# Patient Record
Sex: FEMALE | Race: WHITE | NOT HISPANIC OR LATINO | Employment: PART TIME | URBAN - METROPOLITAN AREA
[De-identification: names, ages, dates, MRNs, and addresses within clinical notes are randomized per-mention and may not be internally consistent; named-entity substitution may affect disease eponyms.]

---

## 2017-04-27 ENCOUNTER — HOSPITAL ENCOUNTER (EMERGENCY)
Facility: HOSPITAL | Age: 37
Discharge: HOME | End: 2017-04-27
Attending: EMERGENCY MEDICINE
Payer: MEDICAID

## 2017-04-27 VITALS
RESPIRATION RATE: 18 BRPM | SYSTOLIC BLOOD PRESSURE: 163 MMHG | TEMPERATURE: 99 F | OXYGEN SATURATION: 99 % | HEIGHT: 64 IN | WEIGHT: 272 LBS | HEART RATE: 84 BPM | DIASTOLIC BLOOD PRESSURE: 92 MMHG | BODY MASS INDEX: 46.44 KG/M2

## 2017-04-27 DIAGNOSIS — E11.65 TYPE 2 DIABETES MELLITUS WITH HYPERGLYCEMIA, WITHOUT LONG-TERM CURRENT USE OF INSULIN: ICD-10-CM

## 2017-04-27 DIAGNOSIS — I10 ESSENTIAL HYPERTENSION: ICD-10-CM

## 2017-04-27 DIAGNOSIS — S82.832A CLOSED FRACTURE OF DISTAL END OF LEFT FIBULA, UNSPECIFIED FRACTURE MORPHOLOGY, INITIAL ENCOUNTER: Primary | ICD-10-CM
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
## 2017-04-27 LAB
ALBUMIN SERPL BCP-MCNC: 3.3 G/DL
ALP SERPL-CCNC: 74 U/L
ALT SERPL W/O P-5'-P-CCNC: 28 U/L
ANION GAP SERPL CALC-SCNC: 13 MMOL/L
AST SERPL-CCNC: 25 U/L
BILIRUB SERPL-MCNC: 0.7 MG/DL
BUN SERPL-MCNC: 9 MG/DL
CALCIUM SERPL-MCNC: 9.5 MG/DL
CHLORIDE SERPL-SCNC: 101 MMOL/L
CO2 SERPL-SCNC: 21 MMOL/L
CREAT SERPL-MCNC: 0.8 MG/DL
EST. GFR  (AFRICAN AMERICAN): >60 ML/MIN/1.73 M^2
EST. GFR  (NON AFRICAN AMERICAN): >60 ML/MIN/1.73 M^2
GLUCOSE SERPL-MCNC: 278 MG/DL (ref 70–110)
GLUCOSE SERPL-MCNC: 322 MG/DL
POCT GLUCOSE: 246 MG/DL (ref 70–110)
POTASSIUM SERPL-SCNC: 3.7 MMOL/L
PROT SERPL-MCNC: 6.6 G/DL
SODIUM SERPL-SCNC: 135 MMOL/L

## 2017-04-27 PROCEDURE — 29515 APPLICATION SHORT LEG SPLINT: CPT | Mod: LT

## 2017-04-27 PROCEDURE — 96361 HYDRATE IV INFUSION ADD-ON: CPT

## 2017-04-27 PROCEDURE — 63600175 PHARM REV CODE 636 W HCPCS: Performed by: EMERGENCY MEDICINE

## 2017-04-27 PROCEDURE — 96374 THER/PROPH/DIAG INJ IV PUSH: CPT

## 2017-04-27 PROCEDURE — 82962 GLUCOSE BLOOD TEST: CPT

## 2017-04-27 PROCEDURE — 25000003 PHARM REV CODE 250: Performed by: EMERGENCY MEDICINE

## 2017-04-27 PROCEDURE — 99284 EMERGENCY DEPT VISIT MOD MDM: CPT | Mod: 25

## 2017-04-27 PROCEDURE — 80053 COMPREHEN METABOLIC PANEL: CPT

## 2017-04-27 RX ORDER — SODIUM CHLORIDE 9 MG/ML
1000 INJECTION, SOLUTION INTRAVENOUS
Status: COMPLETED | OUTPATIENT
Start: 2017-04-27 | End: 2017-04-27

## 2017-04-27 RX ORDER — METFORMIN HYDROCHLORIDE 1000 MG/1
1000 TABLET, FILM COATED, EXTENDED RELEASE ORAL
Qty: 30 TABLET | Refills: 0 | Status: SHIPPED | OUTPATIENT
Start: 2017-04-27 | End: 2017-05-27

## 2017-04-27 RX ORDER — KETOROLAC TROMETHAMINE 30 MG/ML
15 INJECTION, SOLUTION INTRAMUSCULAR; INTRAVENOUS
Status: COMPLETED | OUTPATIENT
Start: 2017-04-27 | End: 2017-04-27

## 2017-04-27 RX ORDER — LISINOPRIL AND HYDROCHLOROTHIAZIDE 10; 12.5 MG/1; MG/1
1 TABLET ORAL DAILY
Qty: 30 TABLET | Refills: 3 | Status: SHIPPED | OUTPATIENT
Start: 2017-04-27 | End: 2017-05-27

## 2017-04-27 RX ORDER — ORPHENADRINE CITRATE 30 MG/ML
60 INJECTION INTRAMUSCULAR; INTRAVENOUS
Status: DISCONTINUED | OUTPATIENT
Start: 2017-04-27 | End: 2017-04-27

## 2017-04-27 RX ORDER — ACETAMINOPHEN AND CODEINE PHOSPHATE 300; 30 MG/1; MG/1
1 TABLET ORAL EVERY 6 HOURS PRN
Qty: 8 TABLET | Refills: 0 | Status: SHIPPED | OUTPATIENT
Start: 2017-04-27

## 2017-04-27 RX ADMIN — SODIUM CHLORIDE 1000 ML: 0.9 INJECTION, SOLUTION INTRAVENOUS at 02:04

## 2017-04-27 RX ADMIN — KETOROLAC TROMETHAMINE 15 MG: 30 INJECTION, SOLUTION INTRAMUSCULAR at 02:04

## 2017-04-27 NOTE — ED AVS SNAPSHOT
[facility]  [address] [place] [address] [place] [address]               Diandra Ventura   2017  1:40 PM   ED    Description:  Female : 1980   Department:  [facility]           Your Care was Coordinated By:     Provider Role From To    Jany Yates MD Attending Provider 17 1341 --      Reason for Visit     Fall           Diagnoses this Visit        Comments    Closed fracture of distal end of left fibula, unspecified fracture morphology, initial encounter    -  Primary     Fall         Essential hypertension         Type 2 diabetes mellitus with hyperglycemia, without long-term current use of insulin           ED Disposition     None           To Do List           Follow-up Information     Please follow up.    Why:  FOLLOW UP WITH YOUR PCP ASAP       These Medications        Disp Refills Start End    acetaminophen-codeine 300-30mg (TYLENOL #3) 300-30 mg Tab 8 tablet 0 2017     Take 1 tablet by mouth every 6 (six) hours as needed. - Oral    SITagliptan (JANUVIA) 50 MG Tab 30 tablet 3 2017    Take 1 tablet (50 mg total) by mouth once daily. - Oral    metformin (GLUMETZA) 1000 MG (MOD) 24 hr tablet 30 tablet 0 2017    Take 1 tablet (1,000 mg total) by mouth daily with breakfast. - Oral    lisinopril-hydrochlorothiazide (PRINZIDE,ZESTORETIC) 10-12.5 mg per tablet 30 tablet 3 2017    Take 1 tablet by mouth once daily. - Oral      [facility]     [facility] Care [facility] -  Assistance  Unless otherwise directed by your provider, please contact [facility], our nurse care line that is available for  assistance.     Registered nurses in the [facility] provide: appointment scheduling, clinical advisement, health education, and other advisory services.  Call: [phone] (toll free)               Medications           Message regarding Medications     Verify the  changes and/or additions to your medication regime listed below are the same as discussed with your clinician today.  If any of these changes or additions are incorrect, please notify your healthcare provider.        START taking these NEW medications        Refills    acetaminophen-codeine 300-30mg (TYLENOL #3) 300-30 mg Tab 0    Sig: Take 1 tablet by mouth every 6 (six) hours as needed.    Class: Print    Route: Oral    SITagliptan (JANUVIA) 50 MG Tab 3    Sig: Take 1 tablet (50 mg total) by mouth once daily.    Class: Print    Route: Oral    metformin (GLUMETZA) 1000 MG (MOD) 24 hr tablet 0    Sig: Take 1 tablet (1,000 mg total) by mouth daily with breakfast.    Class: Print    Route: Oral    lisinopril-hydrochlorothiazide (PRINZIDE,ZESTORETIC) 10-12.5 mg per tablet 3    Sig: Take 1 tablet by mouth once daily.    Class: Print    Route: Oral      These medications were administered today        Dose Freq    0.9%  NaCl infusion 1,000 mL ED 1 Time    Sig: Inject 1,000 mLs into the vein ED 1 Time.    Class: Normal    Route: Intravenous    ketorolac injection 15 mg 15 mg ED 1 Time    Sig: Inject 15 mg into the vein ED 1 Time.    Class: Normal    Route: Intravenous      STOP taking these medications     hydrocodone-acetaminophen 5-325mg (NORCO) 5-325 mg per tablet Take 1 tablet by mouth every 4 (four) hours as needed.           Verify that the below list of medications is an accurate representation of the medications you are currently taking.  If none reported, the list may be blank. If incorrect, please contact your healthcare provider. Carry this list with you in case of emergency.           Current Medications     acetaminophen-codeine 300-30mg (TYLENOL #3) 300-30 mg Tab Take 1 tablet by mouth every 6 (six) hours as needed.    gabapentin (NEURONTIN) 600 MG tablet Take 600 mg by mouth 3 (three) times daily.    lisinopril-hydrochlorothiazide (PRINZIDE,ZESTORETIC) 10-12.5 mg per tablet Take 1 tablet by mouth once  "daily.    metformin (GLUMETZA) 1000 MG (MOD) 24 hr tablet Take 1 tablet (1,000 mg total) by mouth daily with breakfast.    SITagliptan (JANUVIA) 50 MG Tab Take 1 tablet (50 mg total) by mouth once daily.           Clinical Reference Information           Your Vitals Were     BP Pulse Temp Resp Height Weight    163/92 84 98.6 °F (37 °C) (Oral) 18 5' 4" (1.626 m) 123.4 kg (272 lb)    Last Period SpO2 BMI          04/09/2017 99% 46.69 kg/m2        Allergies as of 4/27/2017     No Known Allergies      Immunizations Administered on Date of Encounter - 4/27/2017     None      ED Micro, Lab, POCT     Start Ordered       Status Ordering Provider    04/27/17 1610 04/27/17 1610  POCT glucose  Once      Final result     04/27/17 1600 04/27/17 1559  POCT glucose  Once      Acknowledged     04/27/17 1419 04/27/17 1418  Comprehensive metabolic panel  STAT      Final result     04/27/17 0000 04/27/17 1340  POCT urine pregnancy     Comments:  This order was created through External Result Entry    Completed     04/27/17 0000 04/27/17 1340  POCT glucose     Comments:  This order was created through External Result Entry    Completed       ED Imaging Orders     Start Ordered       Status Ordering Provider    04/27/17 1413 04/27/17 1413  X-Ray Tibia Fibula 2 View Left  1 time imaging      Final result     04/27/17 1413 04/27/17 1413  X-Ray Ankle Complete Left  1 time imaging      Edited Result - FINAL     04/27/17 1413 04/27/17 1413  X-Ray Foot Complete Left  1 time imaging      Final result         Discharge Instructions         FOLLOW UP WITH PRIMARY CARE ASAP  RETURN TO ED IMMEDIATELY IF SYMPTOMS WORSEN  DO NOT TAKE PAIN MEDS AND DRIVE OR OPERATE MACHINERY    Ankle Fracture, Distal Fibula  You have a fracture, or broken bone, of the end of the fibula bone. The fibula is one of two bones that support the ankle joint.    Home care  · You will be given a splint, cast, or special boot to prevent movement at the injury site. Do not put " weight on a splint. It will break. Follow your healthcare providers advice about when to begin bearing weight on a cast or boot.  · Keep your leg elevated when sitting or lying down. When sleeping, place a pillow under the injured leg. When sitting, support the injured leg so it is level with your waist. This is very important during the first 48 hours.  · Keep the cast or splint completely dry at all times. When bathing, protect the cast or splint with 2 large plastic bags. Place 1 bag outside of the other. Tape each bag with duct tape at the top end. Water can still leak in even when the foot is covered. So it's best to keep the cast, splint, or boot away from water. If a fiberglass cast or splint gets wet, dry it with a hair dryer on a cool setting.  · Place an ice pack over the injured area for no more than 15 to 20 minutes. Do this every 3 to 6 hours for the first 24 to 48 hours. Continue this 3 to 4 times a day as needed. To make an ice pack, put ice cubes in a plastic bag that seals at the top. Wrap the bag in a clean, thin towel or cloth. Never put ice or an ice pack directly on the skin. The ice pack can be put right on the cast or splint. As the ice melts, be careful that the cast or splint doesnt get wet.  · You may use over-the-counter pain medicine to control pain, unless another pain medicine was prescribed. If you have chronic liver or kidney disease or ever had a stomach ulcer or GI bleeding, talk with your provider before using these medicines.  Follow-up care  Follow up with your healthcare provider in 1 week, or as advised. This is to be sure the bone is healing properly. If you were given a splint, it may be changed to a cast after the swelling goes down.  If X-rays were taken, you will be told of any new findings that may affect your care.  When to seek medical advice  Call your healthcare provider right away if any of these occur:  · The plaster cast or splint becomes wet or soft  · The  fiberglass cast or splint stays wet for more than 24 hours  · There is increased tightness or pain under the cast or splint  · Your toes become swollen, cold, blue, numb, or tingly  · The cast becomes loose  · The cast has a bad smell  · Sore areas develop under the cast  · The cast develops cracks or breaks   Date Last Reviewed: 11/23/2015  © 1565-1400 [employer]. [address] [place] [address], [place] [address]. All rights reserved. This information is not intended as a substitute for professional medical care. Always follow your healthcare professional's instructions.          Discharge Instructions for High Blood Pressure (Hypertension)  You have been diagnosed with high blood pressure (also called hypertension). This means the force of blood against your artery walls is too strong. It also means your heart is working hard to move blood. High blood pressure usually has no symptoms, but over time, it can damage your heart, blood vessels, eyes, kidneys, and other organs. With help from your doctor, you can manage your blood pressure and protect your health.  Taking medicine  · Learn to take your own blood pressure. Keep a record of your results. Ask your doctor which readings mean that you need medical attention.  · Take your blood pressure medicine exactly as directed. Dont skip doses. Missing doses can cause your blood pressure to get out of control.  · If you do miss a dose (or doses) check with your healthcare provider about what to do.  · Avoid medicine that contain heart stimulants, including over-the-counter drugs. Check for warnings about high blood pressure on the label. Ask the pharmacist before purchasing something you haven't used before  · Check with your doctor or pharmacist before taking a decongestant. Some decongestants can worsen high blood pressure.  Lifestyle changes  · Maintain a healthy weight. Get help to lose any extra pounds.  · Cut back on salt.  ¨ Limit canned, dried, packaged,  "and fast foods.  ¨ Dont add salt to your food at the table.  ¨ Season foods with herbs instead of salt when you cook.  ¨ Request no added salt when you go to a restaurant.  ¨ The American Heart Associations (AHA) "ideal" sodium intake recommendation is 1,500 milligrams per day.  However, since American's eat so much salt, the AHA says a positive change can occur by cutting back to even 2,400 milligrams of sodium a day.   · Follow the DASH (Dietary Approaches to Stop Hypertension) eating plan. This plan recommends vegetables, fruits, whole gains, and other heart healthy foods.  · Begin an exercise program. Ask your doctor how to get started. The American Heart Association recommends aerobic exercise 3 to 4 times a week for an average of 40 minutes at a time, with your doctor's approval. Simple activities like walking or gardening can help.  · Break the smoking habit. Enroll in a stop-smoking program to improve your chances of success. Ask your healthcare provider about programs and medicines to help you stop smoking.  · Limit drinks that contain caffeine (coffee, black or green tea, cola) to 2 per day.  · Never take stimulants such as amphetamines or cocaine; these drugs can be deadly for someone with high blood pressure.  · Control your stress. Learn stress-management techniques.  · Limit alcohol to no more than 1 drink a day for women and 2 drinks a day for men.  Follow-up care  Make a follow-up appointment as directed by our staff.     When to seek medical care  Call your doctor immediately or seek emergency care if you have any of the following:  · Chest pain or shortness of breath (call 911)  · Moderate to severe headache  · Weakness in the muscles of your face, arms, or legs  · Trouble speaking  · Extreme drowsiness  · Confusion  · Fainting or dizziness  · Pulsating or rushing sound in your ears  · Unexplained nosebleed  · Weakness, tingling, or numbness of your face, arms, or legs  · Change in " vision  · Blood pressure measured at home that is greater than 180/110   Date Last Reviewed: 4/27/2016  © 7900-2110 [employer]. [address] [place] [address], [place] [address]. All rights reserved. This information is not intended as a substitute for professional medical care. Always follow your healthcare professional's instructions.        Managing Type 2 Diabetes    Type 2 diabetes is a long-term (chronic) condition. Managing your diabetes means making some changes that may be hard. Your healthcare provider, nurse, diabetes educator, and others can help you.  Managing type 2 diabetes means balancing your medicine with diet and activity. Managing your diabetes may also include checking your blood sugar. And, working with your healthcare provider to prevent complications.  Take your medicine as prescribed  You may take pills (oral medicine) or give yourself shots (usually insulin injections) for diabetes. Or you may use both. Taking your medicines or giving yourself insulin at the right times will help you control your blood sugar. Think about ways that will help you remember to take your medicines the right way every day. Ask your healthcare provider, nurse, or diabetes educator for ideas.  Although you may only take pills for your diabetes, this may change. Over time, most people with type 2 diabetes also use insulin.  Eat healthy  A healthy, well-planned diet helps control the amount of sugar in your blood. It also helps you stay at a healthy weight or lose weight, if you are overweight. Extra weight makes it harder to control diabetes.  Your healthcare provider, nurse, a dietitian, or diabetes educator will help you create a plan that works for you. You don't have to give up all the foods you like. Having meals and snacks with vegetables, fruits, lean meats, or other healthy proteins, whole grains, and low-fat or nonfat dairy products will help control your blood sugar.  Be physically active  Being  active helps lower your blood sugar. It does this by helping your body use insulin to turn food into energy. Activity also helps you manage your weight:  Ask your health care provider to work with you to create an activity program that's right for you. Your activity program is based on your age, general health, and types of activity you enjoy. You should start slowly, but aim for at least 30 minutes of exercise or activity on most days.  Check your blood sugar  Checking your own blood sugar may be a regular part of your care. Or you may only check your blood sugar from time to time. Your healthcare provider will give you instructions about checking your blood sugar at home. Checking it tells you if your blood sugar is in your target range. Having your blood sugars within the target range means that you are managing your diabetes well.  If your blood sugar levels are too high or too low, your healthcare provider may suggest changes to your diet or activity level. He or she may also adjust your medicine.  Your healthcare provider may also tell you to check your blood sugar more often when you are sick. At certain times, for example, when you have a cold or the flu, you may need to check it more often.  Take care of yourself  When you have diabetes, you may be more likely to develop other health problems. They include foot, eye, heart, and kidney problems. By controlling your blood sugar, and taking good care of yourself, you can help prevent these problems. Your health care provider, nurse, diabetes educator, and others can assist you:  · Checkups. You should have regular checkups with your healthcare provider. At those visits, you will have a physical exam that includes checking your feet. Your healthcare provider will also check your blood pressure and weight.  · Other exams. You should also have complete eye, foot, and dental exams at least once every year.  · Lab tests. You will have blood and urine tests:  ¨ At  least two times a year, your healthcare provider will check your hemoglobin A1C. This blood test shows how well you have been controlling your blood sugar over 2 to 3 months. The results help your healthcare provider manage your diabetes.    ¨ You will also have other lab tests. For example, to check for kidney problems and abnormal cholesterol levels.  · Smoking. If you smoke, you will need to quit. Smoking increases the chance that you will develop complications from diabetes. Ask your healthcare provider about ways to quit.  · Vaccines. Get a yearly flu shot. And, ask your healthcare provider about vaccines to prevent pneumonia, shingles, and hepatitis B.  Stress and depression  Most people have challenges throughout their lives. Living with diabetes, or any serious condition, can increase your stress and make you feel a lot of different emotions. In diabetes, feeling stressed or depressed can actually affect your blood sugar levels.  If you are having trouble dealing with diabetes, tell your healthcare provider. He or she can help or refer you to other healthcare providers or programs.  Support and resources  Know where you can get help. You can try the following:  · Support. Ask family and friends to support your effects to take care of yourself. Or, look for a diabetes support group locally or on the internet. (Check the Connect with Others link on www.diabetes.org)  · Counseling. Talk with a , psychologist, psychiatrist, or other counselor.  · Information. Contact the American Diabetes Association at 772-175-7125 or www.diabetes.org  Date Last Reviewed: 6/1/2016 © 2000-2016 [employer]. [address] [place] [address], [place] [address]. All rights reserved. This information is not intended as a substitute for professional medical care. Always follow your healthcare professional's instructions.          [facility] Sign-Up     Activating your [facility] account is as easy as 1-2-3!     1)  Visit [email or web address], select Sign Up Now, enter this activation code and your date of birth, then select Next.  [email or web address]  Expires: 6/11/2017  4:44 PM      2) Create a username and password to use when you visit [facility] in the future and select a security question in case you lose your password and select Next.    3) Enter your e-mail address and click Sign Up!    Additional Information  If you have questions, please e-mail [email or web address] or call [phone] to talk to our [facility] staff. Remember, [facility] is NOT to be used for urgent needs. For medical emergencies, dial 911.         Smoking Cessation     If you would like to quit smoking:   You may be eligible for free services if you are a [place] resident and started smoking cigarettes before September 1, 1988.  Call the Smoking Cessation Trust ([facility]) toll free at [phone] or [phone].   Call 7-801-QUIT-NOW if you do not meet the above criteria.   Contact us via email: [email or web address]   View our website for more information: [email or web address]         [facility] complies with applicable Federal civil rights laws and does not discriminate on the basis of race, color, national origin, age, disability, or sex.        Language Assistance Services     ATTENTION: Language assistance services are available, free of charge. Please call [phone].      ATENCIÓN: Si habla español, tiene a akhtar disposición servicios gratuitos de asistencia lingüística. Llame al [phone].     CHÚ Ý: N?u b?n nói Ti?ng Vi?t, có các d?ch v? h? tr? ngôn ng? mi?n phí dành cho b?n. G?i s? [phone].

## 2017-04-27 NOTE — ED PROVIDER NOTES
"Encounter Date: 2017       History     Chief Complaint   Patient presents with    Fall     slipped at home today, falling onto left side. C/o pain to entire left leg, worst to left foot since the fall     Review of patient's allergies indicates:  No Known Allergies  HPI Comments: 38 Y/O F PRESENTS S/P SLIP AND FALL FROM STANDING POSITION THIS MORNING.  PT REPORTS PAIN TO THE ENTIRE LEG THAT "GETS WORSE AND YOU GO DOWN MY LEG."  NO HEAD, NECK OR BACK INJURY.  PT REPORTS THAT AFTER SHE FELL SHE ATTEMPTED TO STAND UP AND HAD SEVERE PAIN IN HER LEFT FOOT WHILE WEIGHTBEARING.  NO NUMBNESS/WEAKNESS OF LLE.  NO OPEN WOUNDS.  + OLD BRUISING LLE.  PT REPORTS HX OF HTN AND STATES THAT SHE HAS BEEN OFF OF HER MEDS FOR THE PAST MONTH BECAUSE HER PCP WAS ON [facility]ERN[facility] LEAVE AND SHE DOESN'T LIKE ANYONE ELSE IN THE OFFICE.  PT CALLED PCP TODAY AND THEY DID NOT HAVE AN APPT.   PT REPORTS SHE HAS BEEN ON LISINOPRIL/HCTZ FOR SEVERAL YEARS BUT IS UNSURE IF IT WAS EFFECTIVE AT TREATING HER B/P.  NO HA, CP, SOB, OR NEURO SYMPTOMS.     The history is provided by the patient. No  was used.     Past Medical History:   Diagnosis Date    Diabetes mellitus     Hypertension      Past Surgical History:   Procedure Laterality Date     SECTION       History reviewed. No pertinent family history.  Social History   Substance Use Topics    Smoking status: Current Every Day Smoker    Smokeless tobacco: None    Alcohol use No     Review of Systems   Constitutional: Negative for chills and fever.   HENT: Negative for facial swelling.    Eyes: Negative.    Cardiovascular: Negative for chest pain and leg swelling.   Gastrointestinal: Negative for abdominal pain, diarrhea, nausea and vomiting.   Endocrine: Negative for polydipsia and polyuria.   Genitourinary: Negative for difficulty urinating and dysuria.   Musculoskeletal: Negative for back pain and neck pain.        LEFT LOWER EXTREMITY PAIN (ANKLE AND FOOT) "   Skin: Negative for pallor and rash.        OLD BRUISING LEFT CALF, NEW BRUISING LEFT FOOT AND ANKLE   Allergic/Immunologic: Negative for immunocompromised state.   Neurological: Negative for dizziness, weakness, numbness and headaches.   Hematological: Does not bruise/bleed easily.   Psychiatric/Behavioral: Negative for agitation and confusion. The patient is nervous/anxious.    All other systems reviewed and are negative.      Physical Exam   Initial Vitals   BP Pulse Resp Temp SpO2   04/27/17 1337 04/27/17 1337 04/27/17 1337 04/27/17 1337 04/27/17 1337   181/123 109 18 98.2 °F (36.8 °C) 99 %     Physical Exam    Nursing note and vitals reviewed.  Constitutional: She appears well-developed and well-nourished. She is not diaphoretic. No distress.   HTN, ANXIOUS, OBESE F    HENT:   Head: Normocephalic and atraumatic.   Eyes: Conjunctivae and EOM are normal. No scleral icterus.   Neck: Normal range of motion. Neck supple.   Cardiovascular: Normal rate, regular rhythm and normal heart sounds. Exam reveals no gallop and no friction rub.    No murmur heard.  Pulmonary/Chest: Breath sounds normal. No respiratory distress. She has no wheezes. She has no rhonchi. She has no rales.   Abdominal: Soft. Bowel sounds are normal. She exhibits no distension. There is no tenderness. There is no rebound and no guarding.   Musculoskeletal: Normal range of motion. She exhibits tenderness. She exhibits no edema.        Left knee: She exhibits normal range of motion, no swelling, no effusion, no deformity, no laceration, no erythema and no bony tenderness. No tenderness found. No medial joint line and no lateral joint line tenderness noted.        Left ankle: She exhibits normal range of motion, no swelling, no deformity and no laceration. Tenderness. Lateral malleolus and head of 5th metatarsal tenderness found. No medial malleolus and no proximal fibula tenderness found. Achilles tendon exhibits no pain.        Legs:        "Feet:    FULL ROM LEFT HIP, KNEE, ANKLE.  NO DEFORMITY   Lymphadenopathy:     She has no cervical adenopathy.   Neurological: She is alert and oriented to person, place, and time. She has normal strength. No sensory deficit.   Skin: Skin is warm and dry. No rash noted. No erythema.   OLD BRUISING LEFT CALF, NEW BRUISING LEFT FOOT AND ANKLE   Psychiatric: She has a normal mood and affect. Her behavior is normal. Judgment and thought content normal.         ED Course   Procedures  Labs Reviewed - No data to display          Medical Decision Making:   Initial Assessment:   36 Y/O F PRESENTS WITH LLE PAIN S/P FALL AND IS NOTED TO BE HTN.  NO SYMPTOMS OF CP, SOB, OR NEURO SYMPTOMS.   Differential Diagnosis:   DDX:  FRACTURE, DISLOCATION, CONTUSION, SPRAIN, HTN, HTN EMERGENCY, DKA, DM WITH HYPERGLYCEMIA  Clinical Tests:   Lab Tests: Ordered and Reviewed  Radiological Study: Ordered and Reviewed  Medical Tests: Ordered and Reviewed  ED Management:  XRAYS REVEAL POSSIBLE NON-DISPLACED DISTAL FIBULA FRACTURE.  PT DOES HAVE PAIN ON PALPATION OF THIS AREA SO I WILL TX WITH WALKING BOOT.  PT UNDERSTANDS SHE MUST F/U WITH PCP NEXT WEEK AND A REPEAT XRAY WOULD BE APPROPRIATE.  SHE HAS BEEN OUT OF HER HBP AND DM MEDS FOR "MONTHS."  SHE REPORTS THAT HER PCP IS AT A WALK IN CL[facility] AND SHE WILL F/U ASAP.  I WILL RX PT ONE MONTH OF MEDICATIONS FOR HER CHRONIC MEDICAL ISSUES WITH THE UNDERSTANDING THAT SHE WILL F/U WITH PCP ASAP AND RETURN TO ED IMMEDIATELY IF SYMPTOMS WORSEN.                    ED Course     Clinical Impression:   The primary encounter diagnosis was Closed fracture of distal end of left fibula, unspecified fracture morphology, initial encounter. Diagnoses of Fall, Essential hypertension, and Type 2 diabetes mellitus with hyperglycemia, without long-term current use of insulin were also pertinent to this visit.    Disposition:   Disposition: Discharged  Condition: Stable       Jany Yates MD  04/27/17 " [id number]

## 2017-04-27 NOTE — ED NOTES
Pt presents to ED with c/o fall. Pt states she slipped and fell this AM around 0800. Pt states she then went to bed to lay down and rest, thinking pain would get better. She got up a few hours later with worsening pain. Pt unable to bear weight on left leg. Pt c/o pain to entire left leg, worse to left foot. Denies pain to left side or arm. Pt winces with pain on palpation. 2+ pedal pulses noted. Bruising noted to top of left foot and back of left leg. No obvious deformity or swelling noted.

## 2017-04-27 NOTE — DISCHARGE INSTRUCTIONS
FOLLOW UP WITH PRIMARY CARE ASAP  RETURN TO ED IMMEDIATELY IF SYMPTOMS WORSEN  DO NOT TAKE PAIN MEDS AND DRIVE OR OPERATE MACHINERY    Ankle Fracture, Distal Fibula  You have a fracture, or broken bone, of the end of the fibula bone. The fibula is one of two bones that support the ankle joint.    Home care  · You will be given a splint, cast, or special boot to prevent movement at the injury site. Do not put weight on a splint. It will break. Follow your healthcare providers advice about when to begin bearing weight on a cast or boot.  · Keep your leg elevated when sitting or lying down. When sleeping, place a pillow under the injured leg. When sitting, support the injured leg so it is level with your waist. This is very important during the first 48 hours.  · Keep the cast or splint completely dry at all times. When bathing, protect the cast or splint with 2 large plastic bags. Place 1 bag outside of the other. Tape each bag with duct tape at the top end. Water can still leak in even when the foot is covered. So it's best to keep the cast, splint, or boot away from water. If a fiberglass cast or splint gets wet, dry it with a hair dryer on a cool setting.  · Place an ice pack over the injured area for no more than 15 to 20 minutes. Do this every 3 to 6 hours for the first 24 to 48 hours. Continue this 3 to 4 times a day as needed. To make an ice pack, put ice cubes in a plastic bag that seals at the top. Wrap the bag in a clean, thin towel or cloth. Never put ice or an ice pack directly on the skin. The ice pack can be put right on the cast or splint. As the ice melts, be careful that the cast or splint doesnt get wet.  · You may use over-the-counter pain medicine to control pain, unless another pain medicine was prescribed. If you have chronic liver or kidney disease or ever had a stomach ulcer or GI bleeding, talk with your provider before using these medicines.  Follow-up care  Follow up with your healthcare  provider in 1 week, or as advised. This is to be sure the bone is healing properly. If you were given a splint, it may be changed to a cast after the swelling goes down.  If X-rays were taken, you will be told of any new findings that may affect your care.  When to seek medical advice  Call your healthcare provider right away if any of these occur:  · The plaster cast or splint becomes wet or soft  · The fiberglass cast or splint stays wet for more than 24 hours  · There is increased tightness or pain under the cast or splint  · Your toes become swollen, cold, blue, numb, or tingly  · The cast becomes loose  · The cast has a bad smell  · Sore areas develop under the cast  · The cast develops cracks or breaks   Date Last Reviewed: 11/23/2015  © 6258-1772 [employer]. [address] [place] [address]. All rights reserved. This information is not intended as a substitute for professional medical care. Always follow your healthcare professional's instructions.          Discharge Instructions for High Blood Pressure (Hypertension)  You have been diagnosed with high blood pressure (also called hypertension). This means the force of blood against your artery walls is too strong. It also means your heart is working hard to move blood. High blood pressure usually has no symptoms, but over time, it can damage your heart, blood vessels, eyes, kidneys, and other organs. With help from your doctor, you can manage your blood pressure and protect your health.  Taking medicine  · Learn to take your own blood pressure. Keep a record of your results. Ask your doctor which readings mean that you need medical attention.  · Take your blood pressure medicine exactly as directed. Dont skip doses. Missing doses can cause your blood pressure to get out of control.  · If you do miss a dose (or doses) check with your healthcare provider about what to do.  · Avoid medicine that contain heart stimulants, including  "over-the-counter drugs. Check for warnings about high blood pressure on the label. Ask the pharmacist before purchasing something you haven't used before  · Check with your doctor or pharmacist before taking a decongestant. Some decongestants can worsen high blood pressure.  Lifestyle changes  · Maintain a healthy weight. Get help to lose any extra pounds.  · Cut back on salt.  ¨ Limit canned, dried, packaged, and fast foods.  ¨ Dont add salt to your food at the table.  ¨ Season foods with herbs instead of salt when you cook.  ¨ Request no added salt when you go to a restaurant.  ¨ The American Heart Associations (AHA) "ideal" sodium intake recommendation is 1,500 milligrams per day.  However, since American's eat so much salt, the AHA says a positive change can occur by cutting back to even 2,400 milligrams of sodium a day.   · Follow the DASH (Dietary Approaches to Stop Hypertension) eating plan. This plan recommends vegetables, fruits, whole gains, and other heart healthy foods.  · Begin an exercise program. Ask your doctor how to get started. The American Heart Association recommends aerobic exercise 3 to 4 times a week for an average of 40 minutes at a time, with your doctor's approval. Simple activities like walking or gardening can help.  · Break the smoking habit. Enroll in a stop-smoking program to improve your chances of success. Ask your healthcare provider about programs and medicines to help you stop smoking.  · Limit drinks that contain caffeine (coffee, black or green tea, cola) to 2 per day.  · Never take stimulants such as amphetamines or cocaine; these drugs can be deadly for someone with high blood pressure.  · Control your stress. Learn stress-management techniques.  · Limit alcohol to no more than 1 drink a day for women and 2 drinks a day for men.  Follow-up care  Make a follow-up appointment as directed by our staff.     When to seek medical care  Call your doctor immediately or seek " emergency care if you have any of the following:  · Chest pain or shortness of breath (call 911)  · Moderate to severe headache  · Weakness in the muscles of your face, arms, or legs  · Trouble speaking  · Extreme drowsiness  · Confusion  · Fainting or dizziness  · Pulsating or rushing sound in your ears  · Unexplained nosebleed  · Weakness, tingling, or numbness of your face, arms, or legs  · Change in vision  · Blood pressure measured at home that is greater than 180/110   Date Last Reviewed: 4/27/2016  © 3629-2117 [employer]. [address] [place] [address]. All rights reserved. This information is not intended as a substitute for professional medical care. Always follow your healthcare professional's instructions.        Managing Type 2 Diabetes    Type 2 diabetes is a long-term (chronic) condition. Managing your diabetes means making some changes that may be hard. Your healthcare provider, nurse, diabetes educator, and others can help you.  Managing type 2 diabetes means balancing your medicine with diet and activity. Managing your diabetes may also include checking your blood sugar. And, working with your healthcare provider to prevent complications.  Take your medicine as prescribed  You may take pills (oral medicine) or give yourself shots (usually insulin injections) for diabetes. Or you may use both. Taking your medicines or giving yourself insulin at the right times will help you control your blood sugar. Think about ways that will help you remember to take your medicines the right way every day. Ask your healthcare provider, nurse, or diabetes educator for ideas.  Although you may only take pills for your diabetes, this may change. Over time, most people with type 2 diabetes also use insulin.  Eat healthy  A healthy, well-planned diet helps control the amount of sugar in your blood. It also helps you stay at a healthy weight or lose weight, if you are overweight. Extra weight  makes it harder to control diabetes.  Your healthcare provider, nurse, a dietitian, or diabetes educator will help you create a plan that works for you. You don't have to give up all the foods you like. Having meals and snacks with vegetables, fruits, lean meats, or other healthy proteins, whole grains, and low-fat or nonfat dairy products will help control your blood sugar.  Be physically active  Being active helps lower your blood sugar. It does this by helping your body use insulin to turn food into energy. Activity also helps you manage your weight:  Ask your health care provider to work with you to create an activity program that's right for you. Your activity program is based on your age, general health, and types of activity you enjoy. You should start slowly, but aim for at least 30 minutes of exercise or activity on most days.  Check your blood sugar  Checking your own blood sugar may be a regular part of your care. Or you may only check your blood sugar from time to time. Your healthcare provider will give you instructions about checking your blood sugar at home. Checking it tells you if your blood sugar is in your target range. Having your blood sugars within the target range means that you are managing your diabetes well.  If your blood sugar levels are too high or too low, your healthcare provider may suggest changes to your diet or activity level. He or she may also adjust your medicine.  Your healthcare provider may also tell you to check your blood sugar more often when you are sick. At certain times, for example, when you have a cold or the flu, you may need to check it more often.  Take care of yourself  When you have diabetes, you may be more likely to develop other health problems. They include foot, eye, heart, and kidney problems. By controlling your blood sugar, and taking good care of yourself, you can help prevent these problems. Your health care provider, nurse, diabetes educator, and  others can assist you:  · Checkups. You should have regular checkups with your healthcare provider. At those visits, you will have a physical exam that includes checking your feet. Your healthcare provider will also check your blood pressure and weight.  · Other exams. You should also have complete eye, foot, and dental exams at least once every year.  · Lab tests. You will have blood and urine tests:  ¨ At least two times a year, your healthcare provider will check your hemoglobin A1C. This blood test shows how well you have been controlling your blood sugar over 2 to 3 months. The results help your healthcare provider manage your diabetes.    ¨ You will also have other lab tests. For example, to check for kidney problems and abnormal cholesterol levels.  · Smoking. If you smoke, you will need to quit. Smoking increases the chance that you will develop complications from diabetes. Ask your healthcare provider about ways to quit.  · Vaccines. Get a yearly flu shot. And, ask your healthcare provider about vaccines to prevent pneumonia, shingles, and hepatitis B.  Stress and depression  Most people have challenges throughout their lives. Living with diabetes, or any serious condition, can increase your stress and make you feel a lot of different emotions. In diabetes, feeling stressed or depressed can actually affect your blood sugar levels.  If you are having trouble dealing with diabetes, tell your healthcare provider. He or she can help or refer you to other healthcare providers or programs.  Support and resources  Know where you can get help. You can try the following:  · Support. Ask family and friends to support your effects to take care of yourself. Or, look for a diabetes support group locally or on the internet. (Check the Connect with Others link on www.diabetes.org)  · Counseling. Talk with a , psychologist, psychiatrist, or other counselor.  · Information. Contact the American Diabetes  Association at 885-306-3188 or www.diabetes.org  Date Last Reviewed: 6/1/2016  © 6320-9429 [employer]. [address] [place] [address], [place] [address]. All rights reserved. This information is not intended as a substitute for professional medical care. Always follow your healthcare professional's instructions.

## 2020-06-24 ENCOUNTER — TELEPHONE (OUTPATIENT)
Dept: OBSTETRICS AND GYNECOLOGY | Facility: CLINIC | Age: 40
End: 2020-06-24

## 2020-06-24 NOTE — TELEPHONE ENCOUNTER
----- Message from Loly Urena sent at 6/24/2020  8:01 AM CDT -----  Regarding: sooner appt  Type: Appointment Request    Caller is requesting an appointment for a new patient.    Name of Caller: patient  Reason for appointment: cysts  Would the patient rather a call back or a response via [facility]? Call back  Best Call Back Number: [phone]  Additional Information: n/a

## 2020-06-24 NOTE — TELEPHONE ENCOUNTER
Returned call, pt would like a new patient appointment with anyone at the [facility] location. Offered pt first available 7/21 at 3:00pm with Dr. Joshi. Pt accepts and would like to be added to waitlist for sooner appt. Patient verbalized understanding.

## 2021-07-21 ENCOUNTER — OFFICE VISIT (OUTPATIENT)
Dept: URGENT CARE | Facility: CLINIC | Age: 41
End: 2021-07-21
Payer: MEDICAID

## 2021-07-21 VITALS
OXYGEN SATURATION: 98 % | SYSTOLIC BLOOD PRESSURE: 109 MMHG | BODY MASS INDEX: 42.59 KG/M2 | RESPIRATION RATE: 18 BRPM | TEMPERATURE: 98 F | DIASTOLIC BLOOD PRESSURE: 80 MMHG | WEIGHT: 265 LBS | HEIGHT: 66 IN | HEART RATE: 84 BPM

## 2021-07-21 DIAGNOSIS — J02.9 SORE THROAT: Primary | ICD-10-CM

## 2021-07-21 DIAGNOSIS — Z20.822 CLOSE EXPOSURE TO COVID-19 VIRUS: ICD-10-CM

## 2021-07-21 DIAGNOSIS — J06.9 UPPER RESPIRATORY INFECTION WITH COUGH AND CONGESTION: ICD-10-CM

## 2021-07-21 LAB
CTP QC/QA: YES
SARS-COV-2 RDRP RESP QL NAA+PROBE: NEGATIVE

## 2021-07-21 PROCEDURE — U0002: ICD-10-PCS | Mod: QW,S$GLB,, | Performed by: FAMILY MEDICINE

## 2021-07-21 PROCEDURE — U0002 COVID-19 LAB TEST NON-CDC: HCPCS | Mod: QW,S$GLB,, | Performed by: FAMILY MEDICINE

## 2021-07-21 PROCEDURE — 99213 OFFICE O/P EST LOW 20 MIN: CPT | Mod: S$GLB,CS,, | Performed by: FAMILY MEDICINE

## 2021-07-21 PROCEDURE — 99213 PR OFFICE/OUTPT VISIT, EST, LEVL III, 20-29 MIN: ICD-10-PCS | Mod: S$GLB,CS,, | Performed by: FAMILY MEDICINE

## 2021-07-21 RX ORDER — LORATADINE 10 MG/1
10 TABLET ORAL DAILY
Qty: 30 TABLET | Refills: 2 | Status: SHIPPED | OUTPATIENT
Start: 2021-07-21 | End: 2022-07-21

## 2021-07-21 RX ORDER — GUAIFENESIN 600 MG/1
600 TABLET, EXTENDED RELEASE ORAL 2 TIMES DAILY
Qty: 14 TABLET | Refills: 2 | Status: SHIPPED | OUTPATIENT
Start: 2021-07-21 | End: 2021-07-28

## 2024-08-10 ENCOUNTER — OFFICE VISIT (OUTPATIENT)
Dept: URGENT CARE | Facility: CLINIC | Age: 44
End: 2024-08-10
Payer: MEDICAID

## 2024-08-10 VITALS
TEMPERATURE: 98 F | RESPIRATION RATE: 20 BRPM | BODY MASS INDEX: 42.59 KG/M2 | HEIGHT: 66 IN | HEART RATE: 69 BPM | OXYGEN SATURATION: 97 % | SYSTOLIC BLOOD PRESSURE: 146 MMHG | WEIGHT: 265 LBS | DIASTOLIC BLOOD PRESSURE: 86 MMHG

## 2024-08-10 DIAGNOSIS — R05.9 COUGH, UNSPECIFIED TYPE: ICD-10-CM

## 2024-08-10 DIAGNOSIS — R09.82 PND (POST-NASAL DRIP): ICD-10-CM

## 2024-08-10 DIAGNOSIS — U07.1 COVID-19: Primary | ICD-10-CM

## 2024-08-10 LAB
CTP QC/QA: YES
SARS-COV-2 AG RESP QL IA.RAPID: POSITIVE

## 2024-08-10 PROCEDURE — 99203 OFFICE O/P NEW LOW 30 MIN: CPT | Mod: S$GLB,,,

## 2024-08-10 PROCEDURE — 87811 SARS-COV-2 COVID19 W/OPTIC: CPT | Mod: QW,S$GLB,,

## 2024-08-10 RX ORDER — IPRATROPIUM BROMIDE 21 UG/1
2 SPRAY, METERED NASAL 2 TIMES DAILY
Qty: 15.8 ML | Refills: 0 | Status: SHIPPED | OUTPATIENT
Start: 2024-08-10 | End: 2024-09-09

## 2024-08-10 NOTE — PATIENT INSTRUCTIONS
COVID: Paxlovid twice daily for 5 days   Fever/Pain: Tylenol as needed every 4-6 hours  Nasal congestion/Runny nose: Nasal spray twice daily as needed  Monitor for chest pain, shortness of breath, worsening of symptoms, or fever unresponsive to medication.   Please drink plenty of fluids.  Please get plenty of rest.  Please return here or go to the Emergency Department for any concerns or worsening of condition.  If you were prescribed antibiotics, please take them to completion.  If you were given wait & see antibiotics, please wait 5-7 days before taking them, and only take them if your symptoms have worsened or not improved.  If you do begin taking the antibiotics, please take them to completion.  If you were prescribed a narcotic medication, do not drive or operate heavy equipment or machinery while taking these medications.  If you were given a steroid shot in the clinic and have also been given a prescription for a steroid such as Prednisone or a Medrol Dose Pack, please begin taking them tomorrow.  If you do not have Hypertension or any history of palpitations, it is ok to take over the counter Sudafed or Mucinex D or Allegra-D or Claritin-D or Zyrtec-D.  If you do take one of the above, it is ok to combine that with plain over the counter Mucinex or Allegra or Claritin or Zyrtec.  If for example you are taking Zyrtec -D, you can combine that with Mucinex, but not Mucinex-D.  If you are taking Mucinex-D, you can combine that with plain Allegra or Claritin or Zyrtec.   If you do have Hypertension or palpitations, it is safe to take Coricidin HBP for relief of sinus symptoms.  If not allergic, please take over the counter Tylenol (Acetaminophen) and/or Motrin (Ibuprofen) as directed for control of pain and/or fever.  Please follow up with your primary care doctor or specialist as needed.    If you  smoke, please stop smoking.

## 2024-08-10 NOTE — LETTER
August 10, 2024      [facility] [place]  [place] [address]  Phone: [phone]  Fax: [phone]       Patient: Diandra Ventura   YOB: 1980  Date of Visit: 08/10/2024    To Whom It May Concern:    Belia Ventura  was at [facility] on 08/10/2024. The patient may return to work/school on Wednesday, August 14th, 2024 or sooner with no restrictions. If you have any questions or concerns, or if I can be of further assistance, please do not hesitate to contact me.    Sincerely,          Tung Jacobs PA-C

## 2024-08-10 NOTE — PROGRESS NOTES
"Subjective:      Patient ID: Diandra Ventura is a 44 y.o. female.    Vitals:  height is 5' 6" (1.676 m) and weight is 120.2 kg (265 lb). Her oral temperature is 98.4 °F (36.9 °C). Her blood pressure is 146/86 (abnormal) and her pulse is 69. Her respiration is 20 and oxygen saturation is 97%.     Chief Complaint: Cough    Pt is a 43 yo female presenting with cough, congestion, HA.  Onset of symptoms was 3 days ago. Denies any CP, SOB, ABD pain, N/V/D.  Pt reports using OTC Tylenol. Took an at home COVID test.     Cough  This is a new problem. Episode onset: 3 days ago. The problem has been unchanged. The problem occurs constantly. The cough is Non-productive. Associated symptoms include headaches, nasal congestion and postnasal drip. Pertinent negatives include no chest pain, chills, ear pain, eye redness, fever, myalgias, rash, sore throat, shortness of breath or wheezing. She has tried OTC cough suppressant for the symptoms. The treatment provided no relief. Bronchitis: covid.       Constitution: Negative for activity change, appetite change, chills and fever.   HENT:  Positive for postnasal drip. Negative for ear pain, congestion, sinus pain, sinus pressure and sore throat.    Neck: Negative for neck pain.   Cardiovascular:  Negative for chest pain and sob on exertion.   Eyes:  Negative for eye trauma, eye discharge, eye itching, eye redness, photophobia and blurred vision.   Respiratory:  Positive for cough. Negative for shortness of breath, wheezing and asthma.    Gastrointestinal:  Negative for abdominal pain, nausea, vomiting, constipation and diarrhea.   Genitourinary:  Negative for dysuria, frequency, urgency, urine decreased and hematuria.   Musculoskeletal:  Negative for pain and muscle ache.   Skin:  Negative for color change, rash and hives.   Allergic/Immunologic: Negative for seasonal allergies, asthma, hives and sneezing.   Neurological:  Positive for headaches. Negative for dizziness, light-headedness " and altered mental status.   Psychiatric/Behavioral:  Negative for altered mental status and confusion.       Objective:     Physical Exam   Constitutional: She is oriented to person, place, and time. She appears well-developed. She is cooperative.  Non-toxic appearance. She does not appear ill. No distress.      Comments:Pt sitting erect on examination table. No acute respiratory distress, no use of accessory muscles, no notice of nasal flaring.        HENT:   Head: Normocephalic and atraumatic.   Ears:   Right Ear: Hearing, tympanic membrane, external ear and ear canal normal.   Left Ear: Hearing, tympanic membrane, external ear and ear canal normal.   Nose: Rhinorrhea and congestion present. No mucosal edema or nasal deformity. No epistaxis. Right sinus exhibits no maxillary sinus tenderness and no frontal sinus tenderness. Left sinus exhibits no maxillary sinus tenderness and no frontal sinus tenderness.   Mouth/Throat: Uvula is midline, oropharynx is clear and moist and mucous membranes are normal. Mucous membranes are moist. No trismus in the jaw. Normal dentition. No uvula swelling. No oropharyngeal exudate, posterior oropharyngeal edema or posterior oropharyngeal erythema. Oropharynx is clear.   Eyes: Conjunctivae and lids are normal. Pupils are equal, round, and reactive to light. No scleral icterus. Extraocular movement intact   Neck: Trachea normal and phonation normal. Neck supple. No edema present. No erythema present. No neck rigidity present.   Cardiovascular: Normal rate, regular rhythm, normal heart sounds and normal pulses.   Pulmonary/Chest: Effort normal and breath sounds normal. No accessory muscle usage. No apnea, no tachypnea and no bradypnea. No respiratory distress. She has no decreased breath sounds. She has no rhonchi.   Abdominal: Normal appearance.   Musculoskeletal: Normal range of motion.         General: No deformity. Normal range of motion.   Neurological: She is alert and oriented  to person, place, and time. She exhibits normal muscle tone. Coordination normal.   Skin: Skin is warm, dry, intact, not diaphoretic and not pale.   Psychiatric: Her speech is normal and behavior is normal. Judgment and thought content normal.   Nursing note and vitals reviewed.    Results for orders placed or performed in visit on 08/10/24   SARS Coronavirus 2 Antigen, POCT Manual Read   Result Value Ref Range    SARS Coronavirus 2 Antigen Positive (A) Negative     Acceptable Yes        Assessment:     1. COVID-19    2. Cough, unspecified type    3. PND (post-nasal drip)        Plan:   I have reviewed the patient chart and pertinent past imaging/labs.  Pt educated on COVID-19 disease process and protocol. COVID risk score of 1. Paxlovid offered, but encouraged to continue symptomatic treatment.    COVID-19  -     nirmatrelvir-ritonavir 300 mg (150 mg x 2)-100 mg copackaged tablets (EUA); Take 3 tablets by mouth 2 (two) times daily for 5 days. Each dose contains 2 nirmatrelvir (pink tablets) and 1 ritonavir (white tablet). Take all 3 tablets together  Dispense: 30 tablet; Refill: 0    Cough, unspecified type  -     SARS Coronavirus 2 Antigen, POCT Manual Read    PND (post-nasal drip)  -     ipratropium (ATROVENT) 21 mcg (0.03 %) nasal spray; 2 sprays by Each Nostril route 2 (two) times daily.  Dispense: 15.8 mL; Refill: 0

## 2025-05-13 ENCOUNTER — OFFICE VISIT (OUTPATIENT)
Dept: URGENT CARE | Facility: CLINIC | Age: 45
End: 2025-05-13
Payer: MEDICAID

## 2025-05-13 VITALS
BODY MASS INDEX: 42.59 KG/M2 | HEART RATE: 78 BPM | TEMPERATURE: 99 F | HEIGHT: 66 IN | OXYGEN SATURATION: 97 % | DIASTOLIC BLOOD PRESSURE: 74 MMHG | SYSTOLIC BLOOD PRESSURE: 112 MMHG | WEIGHT: 265 LBS | RESPIRATION RATE: 16 BRPM

## 2025-05-13 DIAGNOSIS — J02.9 SORE THROAT: ICD-10-CM

## 2025-05-13 DIAGNOSIS — R52 BODY ACHES: ICD-10-CM

## 2025-05-13 DIAGNOSIS — J01.90 ACUTE VIRAL SINUSITIS: Primary | ICD-10-CM

## 2025-05-13 DIAGNOSIS — B97.89 ACUTE VIRAL SINUSITIS: Primary | ICD-10-CM

## 2025-05-13 DIAGNOSIS — R05.9 COUGH, UNSPECIFIED TYPE: ICD-10-CM

## 2025-05-13 LAB
CTP QC/QA: YES
CTP QC/QA: YES
POC MOLECULAR INFLUENZA A AGN: NEGATIVE
POC MOLECULAR INFLUENZA B AGN: NEGATIVE
SARS-COV+SARS-COV-2 AG RESP QL IA.RAPID: NEGATIVE

## 2025-05-13 PROCEDURE — 87811 SARS-COV-2 COVID19 W/OPTIC: CPT | Mod: QW,S$GLB,, | Performed by: NURSE PRACTITIONER

## 2025-05-13 PROCEDURE — 99213 OFFICE O/P EST LOW 20 MIN: CPT | Mod: S$GLB,,, | Performed by: NURSE PRACTITIONER

## 2025-05-13 PROCEDURE — 87502 INFLUENZA DNA AMP PROBE: CPT | Mod: QW,S$GLB,, | Performed by: NURSE PRACTITIONER

## 2025-05-13 RX ORDER — FLUTICASONE PROPIONATE 50 MCG
1 SPRAY, SUSPENSION (ML) NASAL DAILY
Qty: 16 G | Refills: 0 | Status: SHIPPED | OUTPATIENT
Start: 2025-05-13 | End: 2025-05-20

## 2025-05-13 RX ORDER — BROMPHENIRAMINE MALEATE, PSEUDOEPHEDRINE HYDROCHLORIDE, AND DEXTROMETHORPHAN HYDROBROMIDE 2; 30; 10 MG/5ML; MG/5ML; MG/5ML
5 SYRUP ORAL EVERY 6 HOURS PRN
Qty: 118 ML | Refills: 0 | Status: SHIPPED | OUTPATIENT
Start: 2025-05-13 | End: 2025-05-23

## 2025-05-13 NOTE — LETTER
May 13, 2025      [facility] [address]  [place] [address]  Phone: [phone]  Fax: [phone]       Patient: Diandra Ventura   YOB: 1980  Date of Visit: 05/13/2025    To Whom It May Concern:    Belia Ventura  was at [facility] on 05/13/2025. The patient may return to work/school on 5/15/2025 with no restrictions. Please excuse her for 5/12-5/14. If you have any questions or concerns, or if I can be of further assistance, please do not hesitate to contact me.    Sincerely,      Bessie Rodriguez, NP

## 2025-05-13 NOTE — PATIENT INSTRUCTIONS
Prescriptions sent to pharmacy:  Flonase- steroid nasal spray- spray in nostrils 1-2 times a day to help with runny nose and sinus congestion    Bromphed- decongestant- take 5mL every 6 hours as needed for sinus congestion      Other recommendations:  Oral antihistamine (Claritin, Zyrtec, Allegra,or Xyzal) for post nasal drip and runny nose    Please drink plenty of fluids.  Please get plenty of rest.  Nasal irrigation with a saline spray or Netti Pot like device per their directions is also recommended.  If you  smoke, please stop smoking.    To help ease a sore throat, you can:  Use a sore throat spray.  Suck on hard candy or throat lozenges.  Gargle with warm saltwater a few times each day. Mix of 1/4 teaspoon (1.25 grams) salt in 8 ounces (240 mL) of warm water.  Use a cool mist humidifier to help you breathe easier.      If not allergic, take Tylenol (Acetaminophen) 650 mg to  1 g every 6 hours as needed  and/or Motrin (Ibuprofen) 600 to 800 mg every 6 hours as needed for fever or pain.    Return to clinic if symptoms persist for over 1 week.    Please remember that you have received care at an urgent care today. Urgent cares are not emergency rooms and are not equipped to handle life threatening emergencies and cannot rule in or out certain medical conditions and you may be released before all of your medical problems are known or treated.     Please arrange follow up with your primary care physician or speciality clinic within 2-5 days if your signs and symptoms have not resolved or worsen.     Patient can call our Referral Hotline at [phone] to make an appointment.      Please return here or go to the Emergency Department for any concerns or worsening of condition.  Signs of infection. These include a fever of 100.4°F (38°C) or higher, chills, cough, more sputum or change in color of sputum.  You are having so much trouble breathing that you can only say one or two words at a time.  You need to sit  upright at all times to be able to breathe and or cannot lie down.  You have trouble breathing when talking or sitting still.  You have chest pain when you cough, have trouble breathing but can still talk in full sentences, or cough up blood.

## 2025-05-13 NOTE — PROGRESS NOTES
"Subjective:      Patient ID: Diandra Ventura is a 45 y.o. female.    Vitals:  height is 5' 6" (1.676 m) and weight is 120.2 kg (265 lb). Her oral temperature is 98.9 °F (37.2 °C). Her blood pressure is 112/74 and her pulse is 78. Her respiration is 16 and oxygen saturation is 97%.     Chief Complaint: Sore Throat (/)    This is a 45 y.o. female who presents today with a chief complaint of loss of taste and smell, sore throat, sx started 2 days ago. Also with body aches and chills. No known fever. She reports a hx of gastric surgery so unable to take certain medications without upsetting her stomach.         Sore Throat   Associated symptoms include congestion, coughing and headaches. Pertinent negatives include no ear pain.     Constitution: Positive for chills and fatigue. Negative for fever.   HENT:  Positive for congestion, sinus pressure and sore throat. Negative for ear pain.    Respiratory:  Positive for cough.    Neurological:  Positive for headaches.      Objective:     Physical Exam   Constitutional: She is oriented to person, place, and time. She appears well-developed. She is cooperative.  Non-toxic appearance. She does not appear ill. No distress.   HENT:   Head: Normocephalic and atraumatic.   Ears:   Right Ear: Hearing, external ear and ear canal normal. A middle ear effusion is present.   Left Ear: Hearing, external ear and ear canal normal. A middle ear effusion is present.   Nose: Congestion present. No mucosal edema, rhinorrhea or nasal deformity. No epistaxis. Right sinus exhibits maxillary sinus tenderness and frontal sinus tenderness. Left sinus exhibits maxillary sinus tenderness and frontal sinus tenderness.   Mouth/Throat: Uvula is midline, oropharynx is clear and moist and mucous membranes are normal. No trismus in the jaw. Normal dentition. No uvula swelling. No oropharyngeal exudate, posterior oropharyngeal edema or posterior oropharyngeal erythema.   Eyes: Conjunctivae and lids are normal. " No scleral icterus.   Neck: Trachea normal and phonation normal. Neck supple. No edema present. No erythema present. No neck rigidity present.   Cardiovascular: Normal rate, regular rhythm, normal heart sounds and normal pulses.   Pulmonary/Chest: Effort normal and breath sounds normal. No stridor. No respiratory distress. She has no decreased breath sounds. She has no wheezes. She has no rhonchi.   Abdominal: Normal appearance.   Musculoskeletal: Normal range of motion.         General: No deformity. Normal range of motion.   Lymphadenopathy:     She has no cervical adenopathy.   Neurological: She is alert and oriented to person, place, and time. She exhibits normal muscle tone. Coordination normal.   Skin: Skin is warm, dry, intact, not diaphoretic and not pale.   Psychiatric: Her speech is normal and behavior is normal. Judgment and thought content normal.   Nursing note and vitals reviewed.      Assessment:     1. Acute viral sinusitis    2. Sore throat    3. Body aches    4. Cough, unspecified type      Results for orders placed or performed in visit on 05/13/25   SARS Coronavirus 2 Antigen, POCT Manual Read    Collection Time: 05/13/25  1:59 PM   Result Value Ref Range    SARS Coronavirus 2 Antigen Negative Negative, Presumptive Negative     Acceptable Yes    POCT Influenza A/B MOLECULAR    Collection Time: 05/13/25  2:25 PM   Result Value Ref Range    POC Molecular Influenza A Ag Negative Negative    POC Molecular Influenza B Ag Negative Negative     Acceptable Yes          Plan:       Acute viral sinusitis  -     brompheniramine-pseudoeph-DM (BROMFED DM) 2-30-10 mg/5 mL Syrp; Take 5 mLs by mouth every 6 (six) hours as needed (nasal congestion).  Dispense: 118 mL; Refill: 0  -     fluticasone propionate (FLONASE) 50 mcg/actuation nasal spray; 1 spray (50 mcg total) by Each Nostril route once daily. for 7 days  Dispense: 16 g; Refill: 0    Sore throat  -     SARS Coronavirus 2  Antigen, POCT Manual Read  -     POCT Influenza A/B MOLECULAR    Body aches  -     POCT Influenza A/B MOLECULAR    Cough, unspecified type  -     POCT Influenza A/B MOLECULAR        COVID negative  Flu negative  Will treat for viral sinusitis. Instructed to cut dose of Bromphed in half if it upsets her stomach. Return to clinic if symptoms persist over one week    Patient Instructions   Prescriptions sent to pharmacy:  Flonase- steroid nasal spray- spray in nostrils 1-2 times a day to help with runny nose and sinus congestion    Bromphed- decongestant- take 5mL every 6 hours as needed for sinus congestion      Other recommendations:  Oral antihistamine (Claritin, Zyrtec, Allegra,or Xyzal) for post nasal drip and runny nose    Please drink plenty of fluids.  Please get plenty of rest.  Nasal irrigation with a saline spray or Netti Pot like device per their directions is also recommended.  If you  smoke, please stop smoking.    To help ease a sore throat, you can:  Use a sore throat spray.  Suck on hard candy or throat lozenges.  Gargle with warm saltwater a few times each day. Mix of 1/4 teaspoon (1.25 grams) salt in 8 ounces (240 mL) of warm water.  Use a cool mist humidifier to help you breathe easier.      If not allergic, take Tylenol (Acetaminophen) 650 mg to  1 g every 6 hours as needed  and/or Motrin (Ibuprofen) 600 to 800 mg every 6 hours as needed for fever or pain.    Return to clinic if symptoms persist for over 1 week.    Please remember that you have received care at an urgent care today. Urgent cares are not emergency rooms and are not equipped to handle life threatening emergencies and cannot rule in or out certain medical conditions and you may be released before all of your medical problems are known or treated.     Please arrange follow up with your primary care physician or speciality clinic within 2-5 days if your signs and symptoms have not resolved or worsen.     Patient can call our Referral  Hotline at [phone] to make an appointment.      Please return here or go to the Emergency Department for any concerns or worsening of condition.  Signs of infection. These include a fever of 100.4°F (38°C) or higher, chills, cough, more sputum or change in color of sputum.  You are having so much trouble breathing that you can only say one or two words at a time.  You need to sit upright at all times to be able to breathe and or cannot lie down.  You have trouble breathing when talking or sitting still.  You have chest pain when you cough, have trouble breathing but can still talk in full sentences, or cough up blood.